# Patient Record
Sex: MALE | Race: OTHER | HISPANIC OR LATINO | URBAN - METROPOLITAN AREA
[De-identification: names, ages, dates, MRNs, and addresses within clinical notes are randomized per-mention and may not be internally consistent; named-entity substitution may affect disease eponyms.]

---

## 2017-07-12 ENCOUNTER — INPATIENT (INPATIENT)
Facility: HOSPITAL | Age: 72
LOS: 1 days | Discharge: HOME | DRG: 300 | End: 2017-07-14
Attending: INTERNAL MEDICINE | Admitting: INTERNAL MEDICINE
Payer: MEDICARE

## 2017-07-12 VITALS
TEMPERATURE: 99 F | RESPIRATION RATE: 18 BRPM | HEIGHT: 65 IN | OXYGEN SATURATION: 97 % | DIASTOLIC BLOOD PRESSURE: 76 MMHG | WEIGHT: 141.98 LBS | SYSTOLIC BLOOD PRESSURE: 129 MMHG | HEART RATE: 67 BPM

## 2017-07-12 LAB
ALBUMIN SERPL ELPH-MCNC: 3.5 G/DL — SIGNIFICANT CHANGE UP (ref 3.5–5)
ALP SERPL-CCNC: 62 U/L — SIGNIFICANT CHANGE UP (ref 40–120)
ALT FLD-CCNC: 22 U/L DA — SIGNIFICANT CHANGE UP (ref 10–60)
ANION GAP SERPL CALC-SCNC: 9 MMOL/L — SIGNIFICANT CHANGE UP (ref 5–17)
APPEARANCE UR: CLEAR — SIGNIFICANT CHANGE UP
APTT BLD: 30.8 SEC — SIGNIFICANT CHANGE UP (ref 27.5–37.4)
AST SERPL-CCNC: 21 U/L — SIGNIFICANT CHANGE UP (ref 10–40)
BILIRUB SERPL-MCNC: 0.2 MG/DL — SIGNIFICANT CHANGE UP (ref 0.2–1.2)
BILIRUB UR-MCNC: NEGATIVE — SIGNIFICANT CHANGE UP
BUN SERPL-MCNC: 69 MG/DL — HIGH (ref 7–18)
CALCIUM SERPL-MCNC: 9.1 MG/DL — SIGNIFICANT CHANGE UP (ref 8.4–10.5)
CHLORIDE SERPL-SCNC: 112 MMOL/L — HIGH (ref 96–108)
CK MB BLD-MCNC: <1.3 % — SIGNIFICANT CHANGE UP (ref 0–3.5)
CK MB CFR SERPL CALC: <1 NG/ML — SIGNIFICANT CHANGE UP (ref 0–3.6)
CK SERPL-CCNC: 75 U/L — SIGNIFICANT CHANGE UP (ref 35–232)
CO2 SERPL-SCNC: 18 MMOL/L — LOW (ref 22–31)
COLOR SPEC: YELLOW — SIGNIFICANT CHANGE UP
CREAT SERPL-MCNC: 3.18 MG/DL — HIGH (ref 0.5–1.3)
DIFF PNL FLD: ABNORMAL
GLUCOSE SERPL-MCNC: 88 MG/DL — SIGNIFICANT CHANGE UP (ref 70–99)
GLUCOSE UR QL: NEGATIVE — SIGNIFICANT CHANGE UP
HCT VFR BLD CALC: 23.8 % — LOW (ref 39–50)
HGB BLD-MCNC: 7.6 G/DL — LOW (ref 13–17)
INR BLD: 1.04 RATIO — SIGNIFICANT CHANGE UP (ref 0.88–1.16)
KETONES UR-MCNC: NEGATIVE — SIGNIFICANT CHANGE UP
LEUKOCYTE ESTERASE UR-ACNC: ABNORMAL
MCHC RBC-ENTMCNC: 30.4 PG — SIGNIFICANT CHANGE UP (ref 27–34)
MCHC RBC-ENTMCNC: 31.9 GM/DL — LOW (ref 32–36)
MCV RBC AUTO: 95.1 FL — SIGNIFICANT CHANGE UP (ref 80–100)
NITRITE UR-MCNC: NEGATIVE — SIGNIFICANT CHANGE UP
PH UR: 5 — SIGNIFICANT CHANGE UP (ref 5–8)
PLATELET # BLD AUTO: 189 K/UL — SIGNIFICANT CHANGE UP (ref 150–400)
POTASSIUM SERPL-MCNC: 5.3 MMOL/L — SIGNIFICANT CHANGE UP (ref 3.5–5.3)
POTASSIUM SERPL-SCNC: 5.3 MMOL/L — SIGNIFICANT CHANGE UP (ref 3.5–5.3)
PROT SERPL-MCNC: 9.1 G/DL — HIGH (ref 6–8.3)
PROT UR-MCNC: 30 MG/DL
PROTHROM AB SERPL-ACNC: 11.4 SEC — SIGNIFICANT CHANGE UP (ref 9.8–12.7)
RBC # BLD: 2.5 M/UL — LOW (ref 4.2–5.8)
RBC # FLD: 16.3 % — HIGH (ref 10.3–14.5)
SODIUM SERPL-SCNC: 139 MMOL/L — SIGNIFICANT CHANGE UP (ref 135–145)
SP GR SPEC: 1.01 — SIGNIFICANT CHANGE UP (ref 1.01–1.02)
TROPONIN I SERPL-MCNC: <0.015 NG/ML — SIGNIFICANT CHANGE UP (ref 0–0.04)
UROBILINOGEN FLD QL: NEGATIVE — SIGNIFICANT CHANGE UP
WBC # BLD: 11.8 K/UL — HIGH (ref 3.8–10.5)
WBC # FLD AUTO: 11.8 K/UL — HIGH (ref 3.8–10.5)

## 2017-07-12 RX ORDER — SODIUM CHLORIDE 9 MG/ML
1000 INJECTION INTRAMUSCULAR; INTRAVENOUS; SUBCUTANEOUS ONCE
Qty: 0 | Refills: 0 | Status: COMPLETED | OUTPATIENT
Start: 2017-07-12 | End: 2017-07-12

## 2017-07-12 RX ORDER — ACETYLCYSTEINE 200 MG/ML
1200 VIAL (ML) MISCELLANEOUS ONCE
Qty: 0 | Refills: 0 | Status: COMPLETED | OUTPATIENT
Start: 2017-07-12 | End: 2017-07-12

## 2017-07-12 RX ADMIN — Medication 1200 MILLIGRAM(S): at 23:07

## 2017-07-12 RX ADMIN — SODIUM CHLORIDE 333.33 MILLILITER(S): 9 INJECTION INTRAMUSCULAR; INTRAVENOUS; SUBCUTANEOUS at 23:06

## 2017-07-12 NOTE — ED PROVIDER NOTE - MEDICAL DECISION MAKING DETAILS
71 y/o M pt sent by nephrologist to ED with hematuria and abnormal sonogram result. Will check labs, check urine, EKG, consult nephrologist, and reassess.

## 2017-07-12 NOTE — ED PROVIDER NOTE - PROGRESS NOTE DETAILS
discussed risks/benefits of CTA w contrast w patient, family, radiology and nephro (Dr. Awad), will proceed w study/ premedicate +hydrate prior to/after test. Consent in chart.

## 2017-07-12 NOTE — ED PROVIDER NOTE - OBJECTIVE STATEMENT
73 y/o M pt with PMHx of Throat Cancer and no significant PSHx sent by nephrologist to ED with hematuria for several days, as well as abnormal sonogram result s/p sonogram today. As per family, pt received a sonogram today, and was told to go to the ED immediately after the results were obtained. Pt also reports intermittent dizziness described as lightheadedness. Pt denies shortness of breath, chest pain, or any other complaints. Pt also denies daily medication use. Nephrologist: Dr. Ryan Awad (phone number: [phone]). JUAN M.

## 2017-07-12 NOTE — ED PROVIDER NOTE - CARE PLAN
Principal Discharge DX:	Anemia  Secondary Diagnosis:	CRF (chronic renal failure)  Secondary Diagnosis:	AAA (abdominal aortic aneurysm) without rupture

## 2017-07-13 DIAGNOSIS — E78.5 HYPERLIPIDEMIA, UNSPECIFIED: ICD-10-CM

## 2017-07-13 DIAGNOSIS — N18.9 CHRONIC KIDNEY DISEASE, UNSPECIFIED: ICD-10-CM

## 2017-07-13 DIAGNOSIS — Z29.9 ENCOUNTER FOR PROPHYLACTIC MEASURES, UNSPECIFIED: ICD-10-CM

## 2017-07-13 DIAGNOSIS — D64.9 ANEMIA, UNSPECIFIED: ICD-10-CM

## 2017-07-13 DIAGNOSIS — I71.4 ABDOMINAL AORTIC ANEURYSM, WITHOUT RUPTURE: ICD-10-CM

## 2017-07-13 DIAGNOSIS — Z71.89 OTHER SPECIFIED COUNSELING: ICD-10-CM

## 2017-07-13 DIAGNOSIS — N18.4 CHRONIC KIDNEY DISEASE, STAGE 4 (SEVERE): ICD-10-CM

## 2017-07-13 DIAGNOSIS — C14.0 MALIGNANT NEOPLASM OF PHARYNX, UNSPECIFIED: ICD-10-CM

## 2017-07-13 DIAGNOSIS — I10 ESSENTIAL (PRIMARY) HYPERTENSION: ICD-10-CM

## 2017-07-13 LAB
FERRITIN SERPL-MCNC: 248 NG/ML — SIGNIFICANT CHANGE UP (ref 30–400)
FOLATE SERPL-MCNC: 19.3 NG/ML — SIGNIFICANT CHANGE UP (ref 4.8–24.2)
HAPTOGLOB SERPL-MCNC: 183 MG/DL — SIGNIFICANT CHANGE UP (ref 34–200)
IRON SATN MFR SERPL: 25 % — SIGNIFICANT CHANGE UP (ref 20–55)
IRON SATN MFR SERPL: 50 UG/DL — LOW (ref 65–170)
LDH SERPL L TO P-CCNC: 127 U/L — SIGNIFICANT CHANGE UP (ref 120–225)
OB PNL STL: NEGATIVE — SIGNIFICANT CHANGE UP
TIBC SERPL-MCNC: 200 UG/DL — LOW (ref 250–450)
UIBC SERPL-MCNC: 150 UG/DL — SIGNIFICANT CHANGE UP (ref 110–370)
VIT B12 SERPL-MCNC: 387 PG/ML — SIGNIFICANT CHANGE UP (ref 243–894)

## 2017-07-13 PROCEDURE — 71275 CT ANGIOGRAPHY CHEST: CPT | Mod: 26

## 2017-07-13 PROCEDURE — 93925 LOWER EXTREMITY STUDY: CPT | Mod: 26

## 2017-07-13 PROCEDURE — 99497 ADVNCD CARE PLAN 30 MIN: CPT

## 2017-07-13 PROCEDURE — 99285 EMERGENCY DEPT VISIT HI MDM: CPT | Mod: 25

## 2017-07-13 PROCEDURE — 74174 CTA ABD&PLVS W/CONTRAST: CPT | Mod: 26

## 2017-07-13 PROCEDURE — 99223 1ST HOSP IP/OBS HIGH 75: CPT

## 2017-07-13 RX ORDER — ACETYLCYSTEINE 200 MG/ML
1200 VIAL (ML) MISCELLANEOUS ONCE
Qty: 0 | Refills: 0 | Status: COMPLETED | OUTPATIENT
Start: 2017-07-13 | End: 2017-07-13

## 2017-07-13 RX ORDER — SODIUM CHLORIDE 9 MG/ML
1000 INJECTION INTRAMUSCULAR; INTRAVENOUS; SUBCUTANEOUS
Qty: 0 | Refills: 0 | Status: DISCONTINUED | OUTPATIENT
Start: 2017-07-13 | End: 2017-07-14

## 2017-07-13 RX ORDER — SODIUM CHLORIDE 9 MG/ML
1000 INJECTION INTRAMUSCULAR; INTRAVENOUS; SUBCUTANEOUS ONCE
Qty: 0 | Refills: 0 | Status: COMPLETED | OUTPATIENT
Start: 2017-07-13 | End: 2017-07-13

## 2017-07-13 RX ORDER — ERYTHROPOIETIN 10000 [IU]/ML
20000 INJECTION, SOLUTION INTRAVENOUS; SUBCUTANEOUS ONCE
Qty: 0 | Refills: 0 | Status: COMPLETED | OUTPATIENT
Start: 2017-07-13 | End: 2017-07-13

## 2017-07-13 RX ADMIN — Medication 1200 MILLIGRAM(S): at 01:38

## 2017-07-13 RX ADMIN — SODIUM CHLORIDE 60 MILLILITER(S): 9 INJECTION INTRAMUSCULAR; INTRAVENOUS; SUBCUTANEOUS at 14:22

## 2017-07-13 RX ADMIN — SODIUM CHLORIDE 250 MILLILITER(S): 9 INJECTION INTRAMUSCULAR; INTRAVENOUS; SUBCUTANEOUS at 01:38

## 2017-07-13 RX ADMIN — ERYTHROPOIETIN 20000 UNIT(S): 10000 INJECTION, SOLUTION INTRAVENOUS; SUBCUTANEOUS at 14:22

## 2017-07-13 NOTE — PROGRESS NOTE ADULT - SUBJECTIVE AND OBJECTIVE BOX
NP Note discussed with  primary attending    Patient is a 72y old  Male who presents with a chief complaint of Sent by nephrologist for finding on US Bladder as outpatient (2017 12:05)      INTERVAL HPI/OVERNIGHT EVENTS: no new complaints    MEDICATIONS  (STANDING):  sodium chloride 0.9%. 1000 milliLiter(s) (60 mL/Hr) IV Continuous <Continuous>  epoetin dex Injectable 91944 Unit(s) SubCutaneous once  __________________________________________________  REVIEW OF SYSTEMS:    CONSTITUTIONAL: No fever,   EYES: no acute visual disturbances  NECK: No pain or stiffness  RESPIRATORY: No cough; No shortness of breath  CARDIOVASCULAR: No chest pain, no palpitations  GASTROINTESTINAL: No pain. No nausea or vomiting; No diarrhea   NEUROLOGICAL: No headache or numbness, no tremors  MUSCULOSKELETAL: No joint pain, no muscle pain  GENITOURINARY: no dysuria, no frequency, no hesitancy  PSYCHIATRY: no depression , no anxiety  ALL OTHER  ROS negative        Vital Signs Last 24 Hrs  T(C): 36.6 (2017 13:48), Max: 37.1 (2017 18:36)  T(F): 97.8 (2017 13:48), Max: 98.7 (2017 18:36)  HR: 70 (2017 13:48) (65 - 74)  BP: 149/66 (2017 13:48) (122/72 - 149/66)  BP(mean): --  RR: 16 (2017 13:48) (16 - 18)  SpO2: 100% (2017 13:48) (97% - 100%)    ________________________________________________  PHYSICAL EXAM:  GENERAL: NAD  HEENT:Normocephalic;  conjunctivae and sclerae clear; moist mucous membranes;   NECK : supple  CHEST/LUNG: Clear to auscuitation bilaterally with good air entry   HEART: S1 S2  regular; no murmurs, gallops or rubs  ABDOMEN: Soft, Nontender, Nondistended; Bowel sounds present  EXTREMITIES: no cyanosis; no edema; no calf tenderness  SKIN: warm and dry; no rash  NERVOUS SYSTEM:  Awake and alert; Oriented  to place, person and time ; no new deficits    _________________________________________________  LABS:                        7.6    11.8  )-----------( 189      ( 2017 22:49 )             23.8     07-12    139  |  112<H>  |  69<H>  ----------------------------<  88  5.3   |  18<L>  |  3.18<H>    Ca    9.1      2017 22:49    TPro  9.1<H>  /  Alb  3.5  /  TBili  0.2  /  DBili  x   /  AST  21  /  ALT  22  /  AlkPhos  62      PT/INR - ( 2017 22:49 )   PT: 11.4 sec;   INR: 1.04 ratio         PTT - ( 2017 22:49 )  PTT:30.8 sec  Urinalysis Basic - ( 2017 22:57 )    Color: Yellow / Appearance: Clear / S.010 / pH: x  Gluc: x / Ketone: Negative  / Bili: Negative / Urobili: Negative   Blood: x / Protein: 30 mg/dL / Nitrite: Negative   Leuk Esterase: Trace / RBC: >50 /HPF / WBC 3-5 /HPF   Sq Epi: x / Non Sq Epi: Few / Bacteria: Trace /HPF      CAPILLARY BLOOD GLUCOSE            RADIOLOGY & ADDITIONAL TESTS:    Imaging Personally Reviewed:  YES    Consultant(s) Notes Reviewed:   YES    Care Discussed with Consultants : jojo Awad    Plan of care was discussed with patient and /or primary care giver; all questions and concerns were addressed and care was aligned with patient's wishes.

## 2017-07-13 NOTE — H&P ADULT - PROBLEM SELECTOR PLAN 2
Patient found to be anemic  -patient has no prior labs for comparison  -will check anemia workup for iron deficiency, hemolytic  -Spoke with Dr. Awad, likely anemia of CKD, will give 1 dose of 20,000 Procrit and monitor response

## 2017-07-13 NOTE — H&P ADULT - ASSESSMENT
72M from home PMH of Throat CA (resolved, in remission for last 10 years), HTN (off meds), HLD (off meds) and CKD presents to hospital after being sent by nephrologist for AAA. Seen by vascular, no need for urgent intervention. But found to be anemic as well

## 2017-07-13 NOTE — H&P ADULT - NSHPPHYSICALEXAM_GEN_ALL_CORE
Vital Signs Last 24 Hrs  T(C): 36.3 (13 Jul 2017 05:14), Max: 37.1 (12 Jul 2017 18:36)  T(F): 97.3 (13 Jul 2017 05:14), Max: 98.7 (12 Jul 2017 18:36)  HR: 68 (13 Jul 2017 05:14) (65 - 74)  BP: 129/61 (13 Jul 2017 05:14) (122/72 - 135/60)  BP(mean): --  RR: 16 (13 Jul 2017 05:14) (16 - 18)  SpO2: 100% (13 Jul 2017 05:14) (97% - 100%)    GENERAL: NAD, well-groomed, well-developed  HEAD:  Atraumatic, Normocephalic  EYES: EOMI, PERRLA, conjunctiva and sclera clear  ENMT: No tonsillar erythema, exudates, or enlargement; Moist mucous membranes, Good dentition, No lesions  NECK: Supple, No JVD, Normal thyroid  NERVOUS SYSTEM:  Alert & Oriented X3  CHEST/LUNG: Clear to auscultation bilaterally; No rales, rhonchi, wheezing, or rubs  HEART: Regular rate and rhythm; No murmurs, rubs, or gallops  ABDOMEN: Soft, Nontender, large but nondistended; Bowel sounds present  EXTREMITIES:  2+ Peripheral Pulses, No clubbing, cyanosis, or edema  LYMPH: No lymphadenopathy noted  SKIN: No rashes or lesions

## 2017-07-13 NOTE — H&P ADULT - PROBLEM SELECTOR PLAN 6
Not on any medications at home and BP has been normal here in the hospital  -please monitor BP and may consider starting meds if necessary

## 2017-07-13 NOTE — H&P ADULT - PROBLEM SELECTOR PLAN 1
Patient has 7.1 cm AAA  -seen by vascular surgeon Dr. Laura  -large but asymptomatic  -will need Outpatient repair  -Will DC patient with follow up with Dr. Laura  -Check LE Arterial US r/o ADILIA

## 2017-07-13 NOTE — H&P ADULT - NSHPLABSRESULTS_GEN_ALL_CORE
CT negative for aortic dissection, has AAA infrarenal of 7.1 cm  Patient is anemic to 7.6, no known baseline  Lytes wnl  UA shows hematuria

## 2017-07-13 NOTE — CONSULT NOTE ADULT - SUBJECTIVE AND OBJECTIVE BOX
CAlled by ER re pt w AAA s/p CTA  Pt sent in to ER w recent hematuria/anemia and abnorm outpt abd US.  Got CTA in ER: AAA    Pt denies CP/Back pain/Abd pain/intermitt non-spec ch LE pain.  Eats ok, walks ok  Denies heart probs'  Appy  ?meds  ex smoker  FH: ? AAA    AA, NAD, MICHELE's  No JVD/icterus  ABd: Palp enlarged NT Aorta  Ext's: warm/norm color, no edema  Palp rad, fem, pop (+3), R DP/PT, L PT    Cr 3.18  Hbg 7.6    CTA reviewed: Large 7cm AAA, no rupture/leak/    A/P:   AAA- large/asymp.  Will need elective repair  Renal insuff (gotr contrast last night)- R hand dom.  No punctures in L UE for poss future AVF.  Renal fu  CArdio eval  Screen direct realtives for aneurysms. CAlled by ER re pt w AAA s/p CTA  Pt sent in to ER w recent hematuria/anemia and abnorm outpt abd US.  Got CTA in ER: AAA    Pt denies CP/Back pain/Abd pain/intermitt non-spec ch LE pain.  Eats ok, walks ok  Denies heart probs'  Appy  ?meds  ex smoker  FH: ? AAA    AA, NAD, MICHELE's  No JVD/icterus  ABd: Palp enlarged NT Aorta  Ext's: warm/norm color, no edema  Palp rad, fem, pop (+3), R DP/PT, L PT    Cr 3.18  Hbg 7.6    CTA reviewed: Large 7cm AAA, no rupture/leak/    A/P:   AAA- large/asymp.  Will need elective repair  Renal insuff (gotr contrast last night)- R hand dom.  No punctures in L UE for poss future AVF.  Renal fu  CArdio eval  Screen direct realtives for aneurysms.  Check LE art US r/oPAA's

## 2017-07-13 NOTE — H&P ADULT - PROBLEM SELECTOR PLAN 3
CKD stage 4  -may be close to HD as per nephrologist  -To be seen by Dr. Awad in house  -PT got CT scan with IV contrast to evaluate AAA, got pre and post treatment, monitor Cr in AM

## 2017-07-13 NOTE — GOALS OF CARE CONVERSATION - PERSONAL ADVANCE DIRECTIVE - CONVERSATION DETAILS
patient appoints his daughter Eleonora To as  [phone]  he lives with wife Karma Roy  would like full code but no long term intubation

## 2017-07-13 NOTE — PROGRESS NOTE ADULT - PROBLEM SELECTOR PLAN 2
no baseline  procrit dose as per nephrology   f/u anemia workup for iron deficiency, hemolytic  f/u nephrology Dr. Awad

## 2017-07-13 NOTE — CONSULT NOTE ADULT - PROBLEM SELECTOR RECOMMENDATION 9
Aetiology - hypertensive nephrosclerosis. with microscopic hematuria. Renal arteries not involved by aneurysm. microscopic hematuria. send serologies and labs results will be followed as outpatient

## 2017-07-13 NOTE — PROGRESS NOTE ADULT - PROBLEM SELECTOR PLAN 8
patient appoints his daughter Eleonora To as  [phone]  lives with wife Karma Roy  would like full code   no long term intubation  Goals of care and HCP conversation with patient and family at the bedside more than 30 min

## 2017-07-13 NOTE — H&P ADULT - NSHPREVIEWOFSYSTEMS_GEN_ALL_CORE
REVIEW OF SYSTEMS:  CONSTITUTIONAL: No fever, weight loss, or fatigue  EYES: No eye pain, visual disturbances, or discharge  ENMT:  No difficulty hearing, tinnitus, vertigo; No sinus or throat pain  NECK: No pain or stiffness  BREASTS: No pain, masses, or nipple discharge  RESPIRATORY: No cough, wheezing, chills or hemoptysis; No shortness of breath  CARDIOVASCULAR: No chest pain, palpitations, dizziness, or leg swelling  GASTROINTESTINAL: No abdominal or epigastric pain. No nausea, vomiting, or hematemesis; No diarrhea or constipation. No melena or hematochezia.  GENITOURINARY: No dysuria, frequency, hematuria, or incontinence  NEUROLOGICAL: No headaches, memory loss, loss of strength, numbness, or tremors  SKIN: No itching, burning, rashes, or lesions   LYMPH NODES: No enlarged glands  ENDOCRINE: No heat or cold intolerance; No hair loss  MUSCULOSKELETAL: No joint pain or swelling; No muscle, back, or extremity pain  PSYCHIATRIC: No depression, anxiety, mood swings, or difficulty sleeping  HEME/LYMPH: No easy bruising, or bleeding gums  ALLERY AND IMMUNOLOGIC: No hives or eczema

## 2017-07-13 NOTE — H&P ADULT - HISTORY OF PRESENT ILLNESS
72M from home PMH of Throat CA (resolved, in remission for last 10 years), HTN (off meds), HLD (off meds) and CKD presents to hospital after being sent by nephrologist. Patient sees Dr. Awad as outpatient and it was noted that patient had hematuria and an US renal was done. Incidental finding on the exam was an abdominal aortic aneurysm so patient was sent to hospital for emergent evaluation. As per patient, he is still making urine and when he drinks a lot of water, he will urine accordingly but nephrologist told him that he is close to being on dialysis.

## 2017-07-13 NOTE — CONSULT NOTE ADULT - SUBJECTIVE AND OBJECTIVE BOX
Patient is a 72y Male sent to ED from nephrology clinic. Was first seen at our office yesterday for evaluation of CKD and hematuria. A routine renal sonogram showed aortic aneurysm with ? 40% diameter with thrombosis. Asked to come to ED for emergent evaluation to rule-out  aortic dissection. D/W with ED last night and patient given pre-contrast exposure prophylaxis and CTA done. Has a infra-renal aortic aneurysm with no evidence of dissection. Bilateral renal arteries patent. Seen by vascular and no urgent need for repair. Noted to be anemic, s/p epogen. FE studies  pending.    PAST MEDICAL & SURGICAL HISTORY:  HLD (hyperlipidemia)  HTN (hypertension)  CKD (chronic kidney disease)  Throat cancer  No significant past surgical history    No Known Allergies    Home Medications Reviewed  Hospital Medications:   MEDICATIONS  (STANDING):  sodium chloride 0.9%. 1000 milliLiter(s) (60 mL/Hr) IV Continuous <Continuous>    SOCIAL HISTORY:  Denies ETOh,Smoking,   FAMILY HISTORY:  No pertinent family history in first degree relatives    REVIEW OF SYSTEMS:  CONSTITUTIONAL: No weakness, fevers or chills  EYES/ENT: No visual changes;  No vertigo or throat pain   NECK: No pain or stiffness  RESPIRATORY: No cough, wheezing, hemoptysis; No shortness of breath  CARDIOVASCULAR: No chest pain or palpitations.  GASTROINTESTINAL: No abdominal or epigastric pain. No nausea, vomiting, or hematemesis; No diarrhea or constipation. No melena or hematochezia.  GENITOURINARY: No dysuria, frequency, foamy urine, urinary urgency, incontinence or hematuria  NEUROLOGICAL: No numbness or weakness  SKIN: No itching, burning, rashes, or lesions   VASCULAR: No bilateral lower extremity edema.   All other review of systems is negative unless indicated above.    VITALS:  T(F): 97.8 (17 @ 13:48), Max: 98.7 (17 @ 18:36)  HR: 70 (17 @ 13:48)  BP: 149/66 (17 @ 13:48)  RR: 16 (17 @ 13:48)  SpO2: 100% (17 13:48)  Wt(kg): --    Height (cm): 165.1 ( @ 18:36)  Weight (kg): 64.4 ( 18:36)  BMI (kg/m2): 23.6 ( @ 18:36)  BSA (m2): 1.71 ( @ 18:36)  PHYSICAL EXAM:  Constitutional: NAD  HEENT: anicteric sclera, oropharynx clear, MMM  Neck: No JVD  Respiratory: CTAB, no wheezes, rales or rhonchi  Cardiovascular: S1, S2, RRR  Gastrointestinal: BS+, soft, NT/ND  Extremities: No cyanosis or clubbing. No peripheral edema  Neurological: A/O x 3, no focal deficits  Psychiatric: Normal mood, normal affect  : No CVA tenderness. No couch.   Skin: No rashes  Vascular Access:    LABS:      139  |  112<H>  |  69<H>  ----------------------------<  88  5.3   |  18<L>  |  3.18<H>    Ca    9.1      2017 22:49    TPro  9.1<H>  /  Alb  3.5  /  TBili  0.2  /  DBili      /  AST  21  /  ALT  22  /  AlkPhos  62      Creatinine Trend: 3.18 <--                        7.6    11.8  )-----------( 189      ( 2017 22:49 )             23.8     Urine Studies:  Urinalysis Basic - ( 2017 22:57 )    Color: Yellow / Appearance: Clear / S.010 / pH:   Gluc:  / Ketone: Negative  / Bili: Negative / Urobili: Negative   Blood:  / Protein: 30 mg/dL / Nitrite: Negative   Leuk Esterase: Trace / RBC: >50 /HPF / WBC 3-5 /HPF   Sq Epi:  / Non Sq Epi: Few / Bacteria: Trace /HPF        RADIOLOGY & ADDITIONAL STUDIES:      < from: CT Angio Abdomen and Pelvis w/ IV Cont (17 @ 01:31) >    Vascular:   No intimal flap is seen to suggest aortic dissection. Evaluation of the   aortic root is limited due tocardiac pulsation. Diffuse moderate   vascular disease of the aorta and its branches. Ascending aorta, arch,   descending thoracic aorta is normal caliber with diffuse luminal post   carotid plaque.    There is a fusiform infrarenal abdominal aortic aneurysm measuring up to   7.1 cm maximally. No CT findings of impending rupture are seen.    The celiac axis and SMA are patent. ANGELA is patent. Bilateral renal   arteries are patent. The bilateral iliac arteries are normal caliber.   Bilateral commonfemoral artery measuring up to 1.1 cm with no   significant atherosclerotic disease.      < from: CT Angio Abdomen and Pelvis w/ IV Cont (17 @ 01:31) >  Kidneys: No hydronephrosis or solid mass. Mildly irregular enhancement of   the interpolar region the left kidney is likely secondary to contrast   bolus timing, although it would be difficult to entirely exclude the   possibility of a renal mass. Mild bilateral perinephric stranding is   nonspecific.

## 2017-07-14 VITALS
DIASTOLIC BLOOD PRESSURE: 62 MMHG | TEMPERATURE: 98 F | OXYGEN SATURATION: 100 % | HEART RATE: 72 BPM | RESPIRATION RATE: 18 BRPM | SYSTOLIC BLOOD PRESSURE: 155 MMHG

## 2017-07-14 LAB
24R-OH-CALCIDIOL SERPL-MCNC: 37.7 NG/ML — SIGNIFICANT CHANGE UP (ref 30–100)
ANION GAP SERPL CALC-SCNC: 12 MMOL/L — SIGNIFICANT CHANGE UP (ref 5–17)
AUTO DIFF PNL BLD: NEGATIVE — SIGNIFICANT CHANGE UP
BUN SERPL-MCNC: 57 MG/DL — HIGH (ref 7–18)
C-ANCA SER-ACNC: NEGATIVE — SIGNIFICANT CHANGE UP
CALCIUM SERPL-MCNC: 8.5 MG/DL — SIGNIFICANT CHANGE UP (ref 8.4–10.5)
CALCIUM SERPL-MCNC: 9.1 MG/DL — SIGNIFICANT CHANGE UP (ref 8.4–10.5)
CHLORIDE SERPL-SCNC: 116 MMOL/L — HIGH (ref 96–108)
CHOLEST SERPL-MCNC: 174 MG/DL — SIGNIFICANT CHANGE UP (ref 10–199)
CO2 SERPL-SCNC: 16 MMOL/L — LOW (ref 22–31)
CREAT SERPL-MCNC: 2.71 MG/DL — HIGH (ref 0.5–1.3)
CREATININE, URINE RESULT: 30 MG/DL — SIGNIFICANT CHANGE UP
CULTURE RESULTS: NO GROWTH — SIGNIFICANT CHANGE UP
GLUCOSE SERPL-MCNC: 81 MG/DL — SIGNIFICANT CHANGE UP (ref 70–99)
HBA1C BLD-MCNC: 5.7 % — HIGH (ref 4–5.6)
HBV CORE AB SER-ACNC: SIGNIFICANT CHANGE UP
HBV SURFACE AB SER-ACNC: SIGNIFICANT CHANGE UP
HBV SURFACE AG SER-ACNC: SIGNIFICANT CHANGE UP
HCT VFR BLD CALC: 21.9 % — LOW (ref 39–50)
HCV AB S/CO SERPL IA: 0.22 S/CO — SIGNIFICANT CHANGE UP
HCV AB SERPL-IMP: SIGNIFICANT CHANGE UP
HDLC SERPL-MCNC: 48 MG/DL — SIGNIFICANT CHANGE UP (ref 40–125)
HGB BLD-MCNC: 7.2 G/DL — LOW (ref 13–17)
LIPID PNL WITH DIRECT LDL SERPL: 108 MG/DL — SIGNIFICANT CHANGE UP
MAGNESIUM SERPL-MCNC: 1.6 MG/DL — SIGNIFICANT CHANGE UP (ref 1.6–2.6)
MCHC RBC-ENTMCNC: 30.2 PG — SIGNIFICANT CHANGE UP (ref 27–34)
MCHC RBC-ENTMCNC: 33 GM/DL — SIGNIFICANT CHANGE UP (ref 32–36)
MCV RBC AUTO: 91.5 FL — SIGNIFICANT CHANGE UP (ref 80–100)
P-ANCA SER-ACNC: NEGATIVE — SIGNIFICANT CHANGE UP
PHOSPHATE SERPL-MCNC: 3.5 MG/DL — SIGNIFICANT CHANGE UP (ref 2.5–4.5)
PLATELET # BLD AUTO: 156 K/UL — SIGNIFICANT CHANGE UP (ref 150–400)
POTASSIUM SERPL-MCNC: 4.8 MMOL/L — SIGNIFICANT CHANGE UP (ref 3.5–5.3)
POTASSIUM SERPL-SCNC: 4.8 MMOL/L — SIGNIFICANT CHANGE UP (ref 3.5–5.3)
PROT ?TM UR-MCNC: 43 MG/DL — HIGH (ref 0–12)
PROT SERPL-MCNC: 7.7 G/DL — SIGNIFICANT CHANGE UP (ref 6–8.3)
PROT SERPL-MCNC: 7.7 G/DL — SIGNIFICANT CHANGE UP (ref 6–8.3)
PTH-INTACT FLD-MCNC: 157 PG/ML — HIGH (ref 15–65)
RBC # BLD: 2.39 M/UL — LOW (ref 4.2–5.8)
RBC # FLD: 16.6 % — HIGH (ref 10.3–14.5)
SODIUM SERPL-SCNC: 144 MMOL/L — SIGNIFICANT CHANGE UP (ref 135–145)
SPECIMEN SOURCE: SIGNIFICANT CHANGE UP
T3FREE SERPL-MCNC: 2.1 PG/ML — SIGNIFICANT CHANGE UP (ref 1.8–4.6)
T4 FREE SERPL-MCNC: 0.9 NG/DL — SIGNIFICANT CHANGE UP (ref 0.9–1.8)
TOTAL CHOLESTEROL/HDL RATIO MEASUREMENT: 3.6 RATIO — SIGNIFICANT CHANGE UP (ref 3.4–9.6)
TRANSFERRIN SERPL-MCNC: 177 MG/DL — LOW (ref 200–360)
TRIGL SERPL-MCNC: 89 MG/DL — SIGNIFICANT CHANGE UP (ref 10–149)
TSH SERPL-MCNC: 34 UU/ML — HIGH (ref 0.34–4.82)
VIT D25+D1,25 OH+D1,25 PNL SERPL-MCNC: 35.8 PG/ML — SIGNIFICANT CHANGE UP (ref 19.9–79.3)
WBC # BLD: 7.5 K/UL — SIGNIFICANT CHANGE UP (ref 3.8–10.5)
WBC # FLD AUTO: 7.5 K/UL — SIGNIFICANT CHANGE UP (ref 3.8–10.5)

## 2017-07-14 PROCEDURE — 84484 ASSAY OF TROPONIN QUANT: CPT

## 2017-07-14 PROCEDURE — 87340 HEPATITIS B SURFACE AG IA: CPT

## 2017-07-14 PROCEDURE — 86334 IMMUNOFIX E-PHORESIS SERUM: CPT

## 2017-07-14 PROCEDURE — 85610 PROTHROMBIN TIME: CPT

## 2017-07-14 PROCEDURE — 84165 PROTEIN E-PHORESIS SERUM: CPT

## 2017-07-14 PROCEDURE — 83036 HEMOGLOBIN GLYCOSYLATED A1C: CPT

## 2017-07-14 PROCEDURE — 99285 EMERGENCY DEPT VISIT HI MDM: CPT | Mod: 25

## 2017-07-14 PROCEDURE — 83550 IRON BINDING TEST: CPT

## 2017-07-14 PROCEDURE — 82607 VITAMIN B-12: CPT

## 2017-07-14 PROCEDURE — 93005 ELECTROCARDIOGRAM TRACING: CPT

## 2017-07-14 PROCEDURE — 86036 ANCA SCREEN EACH ANTIBODY: CPT

## 2017-07-14 PROCEDURE — 86706 HEP B SURFACE ANTIBODY: CPT

## 2017-07-14 PROCEDURE — 82728 ASSAY OF FERRITIN: CPT

## 2017-07-14 PROCEDURE — 85730 THROMBOPLASTIN TIME PARTIAL: CPT

## 2017-07-14 PROCEDURE — 84439 ASSAY OF FREE THYROXINE: CPT

## 2017-07-14 PROCEDURE — 36415 COLL VENOUS BLD VENIPUNCTURE: CPT

## 2017-07-14 PROCEDURE — 80061 LIPID PANEL: CPT

## 2017-07-14 PROCEDURE — 80053 COMPREHEN METABOLIC PANEL: CPT

## 2017-07-14 PROCEDURE — 84166 PROTEIN E-PHORESIS/URINE/CSF: CPT

## 2017-07-14 PROCEDURE — 84481 FREE ASSAY (FT-3): CPT

## 2017-07-14 PROCEDURE — 74174 CTA ABD&PLVS W/CONTRAST: CPT

## 2017-07-14 PROCEDURE — 84443 ASSAY THYROID STIM HORMONE: CPT

## 2017-07-14 PROCEDURE — 83970 ASSAY OF PARATHORMONE: CPT

## 2017-07-14 PROCEDURE — 86803 HEPATITIS C AB TEST: CPT

## 2017-07-14 PROCEDURE — 82272 OCCULT BLD FECES 1-3 TESTS: CPT

## 2017-07-14 PROCEDURE — 82550 ASSAY OF CK (CPK): CPT

## 2017-07-14 PROCEDURE — 83010 ASSAY OF HAPTOGLOBIN QUANT: CPT

## 2017-07-14 PROCEDURE — 82553 CREATINE MB FRACTION: CPT

## 2017-07-14 PROCEDURE — 84155 ASSAY OF PROTEIN SERUM: CPT

## 2017-07-14 PROCEDURE — 86704 HEP B CORE ANTIBODY TOTAL: CPT

## 2017-07-14 PROCEDURE — 82306 VITAMIN D 25 HYDROXY: CPT

## 2017-07-14 PROCEDURE — 83615 LACTATE (LD) (LDH) ENZYME: CPT

## 2017-07-14 PROCEDURE — 87086 URINE CULTURE/COLONY COUNT: CPT

## 2017-07-14 PROCEDURE — 86038 ANTINUCLEAR ANTIBODIES: CPT

## 2017-07-14 PROCEDURE — 82652 VIT D 1 25-DIHYDROXY: CPT

## 2017-07-14 PROCEDURE — 83735 ASSAY OF MAGNESIUM: CPT

## 2017-07-14 PROCEDURE — 93925 LOWER EXTREMITY STUDY: CPT

## 2017-07-14 PROCEDURE — 85027 COMPLETE CBC AUTOMATED: CPT

## 2017-07-14 PROCEDURE — 84100 ASSAY OF PHOSPHORUS: CPT

## 2017-07-14 PROCEDURE — 84466 ASSAY OF TRANSFERRIN: CPT

## 2017-07-14 PROCEDURE — 81001 URINALYSIS AUTO W/SCOPE: CPT

## 2017-07-14 PROCEDURE — 71275 CT ANGIOGRAPHY CHEST: CPT

## 2017-07-14 PROCEDURE — 82310 ASSAY OF CALCIUM: CPT

## 2017-07-14 PROCEDURE — 82746 ASSAY OF FOLIC ACID SERUM: CPT

## 2017-07-14 PROCEDURE — 80048 BASIC METABOLIC PNL TOTAL CA: CPT

## 2017-07-14 NOTE — DISCHARGE NOTE ADULT - PLAN OF CARE
Patient will remain asymptomatic Please follow-up with vascular as outpatient for AAA management  Renal diet  Activity as tolerated follow-up with PCP Follow - up with MD Awad as outpatient for noé Follow up with MD Awad for pending serology review, and further management Please follow-up with vascular ([place]) as outpatient for AAA management  Renal diet  Activity as tolerated

## 2017-07-14 NOTE — PROGRESS NOTE ADULT - PROBLEM SELECTOR PLAN 6
IMPROVE SCORE 0. No need for DVT prophylaxis at this time. IMPROVE SCORE 1. No need for DVT prophylaxis at this time.

## 2017-07-14 NOTE — DISCHARGE NOTE ADULT - CARE PROVIDER_API CALL
Ryan Awad), Internal Medicine; Nephrology  [address] [place] [address]  Phone: [phone]  Fax: [phone]    Ronnie Laura), Vascular Surgery  [address] [place] [id number]  Phone: [phone]  Fax: [phone]

## 2017-07-14 NOTE — PROGRESS NOTE ADULT - PROBLEM SELECTOR PLAN 5
History History - patient not on home medication  -lipid panel WNL  -follow-up as outpatient with PCP

## 2017-07-14 NOTE — PROGRESS NOTE ADULT - SUBJECTIVE AND OBJECTIVE BOX
Patient is a 72y Male with CKD IV.    No Known Allergies    Hospital Medications:   MEDICATIONS  (STANDING):  sodium chloride 0.9%. 1000 milliLiter(s) (60 mL/Hr) IV Continuous <Continuous>        VITALS:  T(F): 97.7 (17 @ 05:01), Max: 97.8 (17 @ 13:48)  HR: 74 (17 @ 05:01)  BP: 111/45 (17 @ 05:01)  RR: 16 (17 @ 05:01)  SpO2: 99% (17 @ 05:01)  Wt(kg): --      PHYSICAL EXAM:  Constitutional: NAD  HEENT: anicteric sclera, oropharynx clear, MMM  Neck: No JVD  Respiratory: CTAB, no wheezes, rales or rhonchi  Cardiovascular: S1, S2, RRR  Gastrointestinal: BS+, soft, NT/ND  Extremities: No cyanosis or clubbing. No peripheral edema  Neurological: A/O x 3, no focal deficits  Psychiatric: Normal mood, normal affect    LABS:      144  |  116<H>  |  57<H>  ----------------------------<  81  4.8   |  16<L>  |  2.71<H>    Ca    8.5      2017 07:27  Phos  3.5     07-  Mg     1.6         TPro  9.1<H>  /  Alb  3.5  /  TBili  0.2  /  DBili      /  AST  21  /  ALT  22  /  AlkPhos  62  07-12    Creatinine Trend: 2.71 <--, 3.18 <--                        7.2    7.5   )-----------( 156      ( 2017 07:27 )             21.9     Urine Studies:  Urinalysis Basic - ( 2017 22:57 )    Color: Yellow / Appearance: Clear / S.010 / pH:   Gluc:  / Ketone: Negative  / Bili: Negative / Urobili: Negative   Blood:  / Protein: 30 mg/dL / Nitrite: Negative   Leuk Esterase: Trace / RBC: >50 /HPF / WBC 3-5 /HPF   Sq Epi:  / Non Sq Epi: Few / Bacteria: Trace /HPF        RADIOLOGY & ADDITIONAL STUDIES:

## 2017-07-14 NOTE — PROGRESS NOTE ADULT - PROBLEM SELECTOR PLAN 1
continue to monitor patient for symptoms while in hospital  follow with vascular as outpatient.
large infrarenal 7.1 cm AAA - patient asymptomatic  vascular surgery Dr. Laura consulted - recommends elective sx  f/u LE Arterial US r/o ADILIA
renal function stable. hepatitis panel neg. other serologies pending will be reviewed as outpatient. No objection to D/C today

## 2017-07-14 NOTE — PROGRESS NOTE ADULT - SUBJECTIVE AND OBJECTIVE BOX
Patient is a 72y old  Male who presents with a chief complaint of Sent by nephrologist for finding on US Bladder as outpatient (2017 12:05). PMH significant for     INTERVAL HPI/OVERNIGHT EVENTS: no acute events overnight.    I&O's Summary    Vital Signs Last 24 Hrs  T(C): 36.4 (2017 14:04), Max: 36.5 (2017 05:01)  T(F): 97.6 (2017 14:04), Max: 97.7 (2017 05:01)  HR: 72 (2017 14:04) (69 - 74)  BP: 155/62 (2017 14:04) (111/45 - 155/62)  BP(mean): 85 (2017 14:04) (85 - 85)  RR: 18 (2017 14:04) (16 - 18)  SpO2: 100% (2017 14:04) (94% - 100%)  PAST MEDICAL & SURGICAL HISTORY:  HLD (hyperlipidemia)  HTN (hypertension)  CKD (chronic kidney disease)  Throat cancer  No significant past surgical history      LABS:                        7.2    7.5   )-----------( 156      ( 2017 07:27 )             21.9     07-14    144  |  116<H>  |  57<H>  ----------------------------<  81  4.8   |  16<L>  |  2.71<H>    Ca    8.5      2017 07:27  Phos  3.5     07-14  Mg     1.6     07-14    TPro  9.1<H>  /  Alb  3.5  /  TBili  0.2  /  DBili  x   /  AST  21  /  ALT  22  /  AlkPhos  62  07-12    PT/INR - ( 2017 22:49 )   PT: 11.4 sec;   INR: 1.04 ratio         PTT - ( 2017 22:49 )  PTT:30.8 sec  CAPILLARY BLOOD GLUCOSE      Urinalysis Basic - ( 2017 22:57 )    Color: Yellow / Appearance: Clear / S.010 / pH: x  Gluc: x / Ketone: Negative  / Bili: Negative / Urobili: Negative   Blood: x / Protein: 30 mg/dL / Nitrite: Negative   Leuk Esterase: Trace / RBC: >50 /HPF / WBC 3-5 /HPF   Sq Epi: x / Non Sq Epi: Few / Bacteria: Trace /HPF        MEDICATIONS  (STANDING):  sodium chloride 0.9%. 1000 milliLiter(s) (60 mL/Hr) IV Continuous <Continuous>    MEDICATIONS  (PRN):      REVIEW OF SYSTEMS:  CONSTITUTIONAL: No fever, weight loss, or fatigue  EYES: No eye pain, visual disturbances, or discharge  ENMT:  No difficulty hearing, tinnitus, vertigo; No sinus or throat pain  NECK: No pain or stiffness  RESPIRATORY: No cough, wheezing, chills or hemoptysis; No shortness of breath  CARDIOVASCULAR: No chest pain, palpitations, dizziness, or leg swelling  GASTROINTESTINAL: No abdominal or epigastric pain. No nausea, vomiting, or hematemesis; No diarrhea or constipation. No melena or hematochezia.  GENITOURINARY: No dysuria, frequency, hematuria, or incontinence  NEUROLOGICAL: No headaches, memory loss, loss of strength, numbness, or tremors  SKIN: No itching, burning, rashes, or lesions   LYMPH NODES: No enlarged glands  ENDOCRINE: No heat or cold intolerance; No hair loss  MUSCULOSKELETAL: No joint pain or swelling; No muscle, back, or extremity pain  PSYCHIATRIC: No depression, anxiety, mood swings, or difficulty sleeping  HEME/LYMPH: No easy bruising, or bleeding gums  ALLERGY AND IMMUNOLOGIC: No hives or eczema    RADIOLOGY & ADDITIONAL TESTS:  < from: CT Angio Abdomen and Pelvis w/ IV Cont (17 @ 01:31) >  There is a fusiform infrarenal abdominal aortic aneurysm measuring up to   7.1 cm maximally. No CT findings of impending rupture are seen.  The celiac axis and SMA are patent. ANGELA is patent. Bilateral renal   arteries are patent. The bilateral iliac arteries are normal caliber.   Bilateral commonfemoral artery measuring up to 1.1 cm with no   significant atherosclerotic disease.  Thorax:  Airways: Tracheobronchial tree is patent.  Lungs: No pneumonia, pulmonary edema, or dominant masses. Moderate   predominantly paraseptal emphysema.  Biliary: No dilatation. Cholelithiasis.  Kidneys: No hydronephrosis or solid mass. Mildly irregular enhancement of   the interpolar region the left kidney is likely secondary to contrast   bolus timing, although it would be difficult to entirely exclude the   possibility of a renal mass. Mild bilateral perinephric stranding is   nonspecific.    IMPRESSION:  Negative for aortic dissection.  Large infrarenal AAA measuring 7.1 cm. No CT findings of rupture.      < from: US Duplex Arterial Lower Ext Compl, Bilateral (17 @ 13:19) >  Interpretation:   Right leg: There is ectasia of the right popliteal artery to a maximum   measured diameter of 8.3 mm. Velocity is symmetrical with the left side.  Left leg: There is ectasia of the left popliteal artery to a maximum   measured diameter of 7.8 mm. Velocity is symmetrical with the right side.  IMPRESSION:  Prominent popliteal arteries which do not meet criteria for   aneurysmal dilatation.        Imaging Personally Reviewed:  [x] YES  [ ] NO    Consultant(s) Notes Reviewed:  [x] YES  [ ] NO    PHYSICAL EXAM:  GENERAL: NAD, well-groomed, well-developed male lying in bed  HEAD:  Atraumatic, Normocephalic  EYES: EOMI, PERRLA, conjunctiva and sclera clear  ENMT: No tonsillar erythema, exudates, or enlargement; Moist mucous membranes, Good dentition, No lesions  NECK: Supple, No JVD noted  NERVOUS SYSTEM:  Alert & Oriented X3, Good concentration; Motor Strength 5/5 B/L upper and lower extremities; DTRs 2+ intact and symmetric  CHEST/LUNG: Clear to percussion bilaterally; No rales, rhonchi, wheezing, or rubs  HEART: Regular rate and rhythm; No murmurs, rubs, or gallops  ABDOMEN: Soft, Nontender, Nondistended; Bowel sounds present  EXTREMITIES:  2+ Peripheral Pulses, No clubbing, cyanosis, or edema  LYMPH: No lymphadenopathy noted  SKIN: No rashes or lesions    Care Collaborated Discussed with Consultants/Other Providers [x] YES  [ ] NO Patient is a 72y old  Male who presents with a chief complaint of Sent by nephrologist for finding on US Bladder as outpatient (2017 12:05). PMH significant for HTN, HLD, CKD stage 4, and throat cancer 10yrs ago.      INTERVAL HPI/OVERNIGHT EVENTS: no acute events overnight.    I&O's Summary    Vital Signs Last 24 Hrs  T(C): 36.4 (2017 14:04), Max: 36.5 (2017 05:01)  T(F): 97.6 (2017 14:04), Max: 97.7 (2017 05:01)  HR: 72 (2017 14:04) (69 - 74)  BP: 155/62 (2017 14:04) (111/45 - 155/62)  BP(mean): 85 (2017 14:04) (85 - 85)  RR: 18 (2017 14:04) (16 - 18)  SpO2: 100% (2017 14:04) (94% - 100%)  PAST MEDICAL & SURGICAL HISTORY:  HLD (hyperlipidemia)  HTN (hypertension)  CKD (chronic kidney disease)  Throat cancer  No significant past surgical history      LABS:                        7.2    7.5   )-----------( 156      ( 2017 07:27 )             21.9     07-14    144  |  116<H>  |  57<H>  ----------------------------<  81  4.8   |  16<L>  |  2.71<H>    Ca    8.5      2017 07:27  Phos  3.5     07-14  Mg     1.6     07-14    TPro  9.1<H>  /  Alb  3.5  /  TBili  0.2  /  DBili  x   /  AST  21  /  ALT  22  /  AlkPhos  62  07-12    PT/INR - ( 2017 22:49 )   PT: 11.4 sec;   INR: 1.04 ratio         PTT - ( 2017 22:49 )  PTT:30.8 sec  CAPILLARY BLOOD GLUCOSE      Urinalysis Basic - ( 2017 22:57 )    Color: Yellow / Appearance: Clear / S.010 / pH: x  Gluc: x / Ketone: Negative  / Bili: Negative / Urobili: Negative   Blood: x / Protein: 30 mg/dL / Nitrite: Negative   Leuk Esterase: Trace / RBC: >50 /HPF / WBC 3-5 /HPF   Sq Epi: x / Non Sq Epi: Few / Bacteria: Trace /HPF        MEDICATIONS  (STANDING):  sodium chloride 0.9%. 1000 milliLiter(s) (60 mL/Hr) IV Continuous <Continuous>    MEDICATIONS  (PRN):      REVIEW OF SYSTEMS:  CONSTITUTIONAL: No fever, weight loss, or fatigue  EYES: No eye pain, visual disturbances, or discharge  ENMT:  No difficulty hearing, tinnitus, vertigo; No sinus or throat pain  NECK: No pain or stiffness  RESPIRATORY: No cough, wheezing, chills or hemoptysis; No shortness of breath  CARDIOVASCULAR: No chest pain, palpitations, dizziness, or leg swelling  GASTROINTESTINAL: No abdominal or epigastric pain. No nausea, vomiting, or hematemesis; No diarrhea or constipation. No melena or hematochezia.  GENITOURINARY: No dysuria, frequency, hematuria, or incontinence  NEUROLOGICAL: No headaches, memory loss, loss of strength, numbness, or tremors  SKIN: No itching, burning, rashes, or lesions   LYMPH NODES: No enlarged glands  ENDOCRINE: No heat or cold intolerance; No hair loss  MUSCULOSKELETAL: No joint pain or swelling; No muscle, back, or extremity pain  PSYCHIATRIC: No depression, anxiety, mood swings, or difficulty sleeping  HEME/LYMPH: No easy bruising, or bleeding gums  ALLERGY AND IMMUNOLOGIC: No hives or eczema    RADIOLOGY & ADDITIONAL TESTS:  < from: CT Angio Abdomen and Pelvis w/ IV Cont (17 @ 01:31) >  There is a fusiform infrarenal abdominal aortic aneurysm measuring up to   7.1 cm maximally. No CT findings of impending rupture are seen.  The celiac axis and SMA are patent. ANGELA is patent. Bilateral renal   arteries are patent. The bilateral iliac arteries are normal caliber.   Bilateral commonfemoral artery measuring up to 1.1 cm with no   significant atherosclerotic disease.  Thorax:  Airways: Tracheobronchial tree is patent.  Lungs: No pneumonia, pulmonary edema, or dominant masses. Moderate   predominantly paraseptal emphysema.  Biliary: No dilatation. Cholelithiasis.  Kidneys: No hydronephrosis or solid mass. Mildly irregular enhancement of   the interpolar region the left kidney is likely secondary to contrast   bolus timing, although it would be difficult to entirely exclude the   possibility of a renal mass. Mild bilateral perinephric stranding is   nonspecific.    IMPRESSION:  Negative for aortic dissection.  Large infrarenal AAA measuring 7.1 cm. No CT findings of rupture.      < from: US Duplex Arterial Lower Ext Compl, Bilateral (17 @ 13:19) >  Interpretation:   Right leg: There is ectasia of the right popliteal artery to a maximum   measured diameter of 8.3 mm. Velocity is symmetrical with the left side.  Left leg: There is ectasia of the left popliteal artery to a maximum   measured diameter of 7.8 mm. Velocity is symmetrical with the right side.  IMPRESSION:  Prominent popliteal arteries which do not meet criteria for   aneurysmal dilatation.        Imaging Personally Reviewed:  [x] YES  [ ] NO    Consultant(s) Notes Reviewed:  [x] YES  [ ] NO    PHYSICAL EXAM:  GENERAL: NAD, well-groomed, well-developed male lying in bed  HEAD:  Atraumatic, Normocephalic  EYES: EOMI, PERRLA, conjunctiva and sclera clear  ENMT: No tonsillar erythema, exudates, or enlargement; Moist mucous membranes, Good dentition, No lesions  NECK: Supple, No JVD noted  NERVOUS SYSTEM:  Alert & Oriented X3, Good concentration; Motor Strength 5/5 B/L upper and lower extremities; DTRs 2+ intact and symmetric  CHEST/LUNG: Clear to percussion bilaterally; No rales, rhonchi, wheezing, or rubs  HEART: Regular rate and rhythm; No murmurs, rubs, or gallops  ABDOMEN: pulsatile aortic area noted. Soft, Nontender, Nondistended; Bowel sounds present  EXTREMITIES:  2+ Peripheral Pulses, No clubbing, cyanosis, or edema  LYMPH: No lymphadenopathy noted  SKIN: No rashes or lesions    Care Collaborated Discussed with Consultants/Other Providers [x] YES  [ ] NO

## 2017-07-14 NOTE — DISCHARGE NOTE ADULT - PATIENT PORTAL LINK FT
“You can access the [facility] Patient Portal, offered by [facility], by registering with the following website: [email or web address]”

## 2017-07-14 NOTE — DISCHARGE NOTE ADULT - HOSPITAL COURSE
72M from home PMH of Throat CA (resolved, in remission for last 10 years), HTN (off meds), HLD (off meds) and CKD presents to hospital after being sent by nephrologist for AAA. Seen by vascular, no need for urgent intervention. But found to be anemic as well. Admitted to medicine to management     AAA (abdominal aortic aneurysm) without rupture.    - Patient has 7.1 cm AAA  -seen by vascular surgeon Dr. Laura  -large but asymptomatic  -Plan for outpatient repair  -LE Arterial US negative for popliteal aneurysm     Anemia  - Patient found to be anemic  -patient has no prior labs for comparison  -Spoke with Dr. Awad, likely anemia of CKD, given 1 dose of 20,000 Procrit and monitored response.   -Will follow with him for aranesp as outpatien     CKD (chronic kidney disease).  -CKD stage 4  -seen by Dr. Awad in house  -PT got CT scan with IV contrast to evaluate AAA, got pre and post treatment, monitored Cr- remains stable    Throat cancer.    - In remission, has not recurred in 10 years  -no need for intervention.      HLD (hyperlipidemia).    As per patient, used to be on medication but no longer needed it  -lipid profile WNL    HTN (hypertension).  - Not on any medications at home and BP has been normal here in the hospital     Need for prophylactic measure.    - No indication for chem DVT PPX for IMPROVE score = 1  will give venodynes  No indication for GI PPX.

## 2017-07-14 NOTE — PROGRESS NOTE ADULT - PROBLEM SELECTOR PLAN 2
continue renal diet renal function stable  -hepatitis panel neg. other serologies pending will be reviewed as outpatient.  -Cleared by renal for DC   -continue renal diet

## 2017-07-14 NOTE — PROGRESS NOTE ADULT - ASSESSMENT
72 yr old male with CKD IV with HTN. Admitted for evaluation of aortic aneurysm
72M from home PMH of Throat CA (resolved, in remission for last 10 years), HTN (off meds), HLD (off meds) and CKD presents to hospital after being sent by nephrologist for AAA. Seen by vascular, no need for urgent intervention. But found to be anemic as well
Patient is a 72y old  Male who presents with a chief complaint of Sent by nephrologist for finding on US Bladder as outpatient (13 Jul 2017 12:05). PMH significant for HTN, HLD, CKD stage 4, and throat cancer 10yrs ago. Given epoetin and now stable for discharge. Pt to follow-up with vascular as outpatient since currently asymptomatic.

## 2017-07-14 NOTE — PROGRESS NOTE ADULT - PROBLEM SELECTOR PROBLEM 1
AAA (abdominal aortic aneurysm) without rupture
Stage 4 chronic kidney disease
AAA (abdominal aortic aneurysm) without rupture

## 2017-07-14 NOTE — DISCHARGE NOTE ADULT - CARE PLAN
Principal Discharge DX:	AAA (abdominal aortic aneurysm) without rupture  Goal:	Patient will remain asymptomatic  Instructions for follow-up, activity and diet:	Please follow-up with vascular as outpatient for AAA management  Renal diet  Activity as tolerated  Secondary Diagnosis:	Anemia  Secondary Diagnosis:	CKD (chronic kidney disease)  Secondary Diagnosis:	HLD (hyperlipidemia)  Secondary Diagnosis:	HTN (hypertension) Principal Discharge DX:	AAA (abdominal aortic aneurysm) without rupture  Goal:	Patient will remain asymptomatic  Instructions for follow-up, activity and diet:	Please follow-up with vascular ([place]) as outpatient for AAA management  Renal diet  Activity as tolerated  Secondary Diagnosis:	Anemia  Instructions for follow-up, activity and diet:	Follow - up with MD Awad as outpatient for aranesp  Secondary Diagnosis:	CKD (chronic kidney disease)  Instructions for follow-up, activity and diet:	Follow up with MD Awad for pending serology review, and further management  Secondary Diagnosis:	HLD (hyperlipidemia)  Instructions for follow-up, activity and diet:	follow-up with PCP  Secondary Diagnosis:	HTN (hypertension)  Instructions for follow-up, activity and diet:	follow-up with PCP

## 2017-07-15 LAB
ANA PAT FLD IF-IMP: ABNORMAL
ANA TITR SER: ABNORMAL
T3FREE SERPL-MCNC: 2.05 PG/ML — SIGNIFICANT CHANGE UP (ref 1.8–4.6)

## 2017-07-17 LAB
% ALBUMIN: 45.5 % — SIGNIFICANT CHANGE UP
% ALPHA 1: 5.2 % — SIGNIFICANT CHANGE UP
% ALPHA 2: 12.1 % — SIGNIFICANT CHANGE UP
% BETA: 11.6 % — SIGNIFICANT CHANGE UP
% GAMMA, URINE: 30.2 % — SIGNIFICANT CHANGE UP
% GAMMA: 25.6 % — SIGNIFICANT CHANGE UP
ALBUMIN 24H MFR UR ELPH: 46.6 % — SIGNIFICANT CHANGE UP
ALBUMIN SERPL ELPH-MCNC: 3.5 G/DL — LOW (ref 3.6–5.5)
ALBUMIN/GLOB SERPL ELPH: 0.8 RATIO — SIGNIFICANT CHANGE UP
ALPHA1 GLOB 24H MFR UR ELPH: 6.4 % — SIGNIFICANT CHANGE UP
ALPHA1 GLOB SERPL ELPH-MCNC: 0.4 G/DL — SIGNIFICANT CHANGE UP (ref 0.1–0.4)
ALPHA2 GLOB 24H MFR UR ELPH: 8.1 % — SIGNIFICANT CHANGE UP
ALPHA2 GLOB SERPL ELPH-MCNC: 0.9 G/DL — SIGNIFICANT CHANGE UP (ref 0.5–1)
B-GLOBULIN 24H MFR UR ELPH: 8.7 % — SIGNIFICANT CHANGE UP
B-GLOBULIN SERPL ELPH-MCNC: 0.9 G/DL — SIGNIFICANT CHANGE UP (ref 0.5–1)
GAMMA GLOBULIN: 2 G/DL — HIGH (ref 0.6–1.6)
INTERPRETATION 24H UR IFE-IMP: SIGNIFICANT CHANGE UP
INTERPRETATION SERPL IFE-IMP: SIGNIFICANT CHANGE UP
M PROTEIN 24H UR ELPH-MRATE: SIGNIFICANT CHANGE UP
PROT ?TM UR-MCNC: 43 MG/DL — HIGH (ref 0–12)
PROT PATTERN 24H UR ELPH-IMP: SIGNIFICANT CHANGE UP
PROT PATTERN SERPL ELPH-IMP: SIGNIFICANT CHANGE UP
TOTAL VOLUME - 24 HOUR: SIGNIFICANT CHANGE UP ML
URINE CREATININE CALCULATION: SIGNIFICANT CHANGE UP G/24 H (ref 1–2)

## 2017-07-20 DIAGNOSIS — I71.4 ABDOMINAL AORTIC ANEURYSM, WITHOUT RUPTURE: ICD-10-CM

## 2017-07-20 DIAGNOSIS — R31.29 OTHER MICROSCOPIC HEMATURIA: ICD-10-CM

## 2017-07-20 DIAGNOSIS — Z85.89 PERSONAL HISTORY OF MALIGNANT NEOPLASM OF OTHER ORGANS AND SYSTEMS: ICD-10-CM

## 2017-07-20 DIAGNOSIS — N18.4 CHRONIC KIDNEY DISEASE, STAGE 4 (SEVERE): ICD-10-CM

## 2017-07-20 DIAGNOSIS — D63.1 ANEMIA IN CHRONIC KIDNEY DISEASE: ICD-10-CM

## 2017-07-20 DIAGNOSIS — E78.5 HYPERLIPIDEMIA, UNSPECIFIED: ICD-10-CM

## 2017-07-20 DIAGNOSIS — I12.9 HYPERTENSIVE CHRONIC KIDNEY DISEASE WITH STAGE 1 THROUGH STAGE 4 CHRONIC KIDNEY DISEASE, OR UNSPECIFIED CHRONIC KIDNEY DISEASE: ICD-10-CM

## 2017-07-20 PROBLEM — Z00.00 ENCOUNTER FOR PREVENTIVE HEALTH EXAMINATION: Status: ACTIVE | Noted: 2017-07-20

## 2017-08-28 ENCOUNTER — APPOINTMENT (OUTPATIENT)
Dept: VASCULAR SURGERY | Facility: CLINIC | Age: 72
End: 2017-08-28
Payer: MEDICARE

## 2017-08-28 VITALS
HEIGHT: 64 IN | HEART RATE: 65 BPM | DIASTOLIC BLOOD PRESSURE: 72 MMHG | SYSTOLIC BLOOD PRESSURE: 146 MMHG | BODY MASS INDEX: 24.59 KG/M2 | OXYGEN SATURATION: 100 % | TEMPERATURE: 98.5 F | WEIGHT: 144 LBS

## 2017-08-28 VITALS
WEIGHT: 144 LBS | HEART RATE: 65 BPM | DIASTOLIC BLOOD PRESSURE: 75 MMHG | HEIGHT: 64 IN | TEMPERATURE: 98.5 F | OXYGEN SATURATION: 100 % | SYSTOLIC BLOOD PRESSURE: 130 MMHG | BODY MASS INDEX: 24.59 KG/M2

## 2017-08-28 DIAGNOSIS — Z85.819 PERSONAL HISTORY OF MALIGNANT NEOPLASM OF UNSPECIFIED SITE OF LIP, ORAL CAVITY, AND PHARYNX: ICD-10-CM

## 2017-08-28 DIAGNOSIS — D64.9 ANEMIA, UNSPECIFIED: ICD-10-CM

## 2017-08-28 DIAGNOSIS — N28.9 DISORDER OF KIDNEY AND URETER, UNSPECIFIED: ICD-10-CM

## 2017-08-28 PROCEDURE — 93880 EXTRACRANIAL BILAT STUDY: CPT

## 2017-08-28 PROCEDURE — XXXXX: CPT

## 2017-08-28 PROCEDURE — 99213 OFFICE O/P EST LOW 20 MIN: CPT | Mod: 25

## 2017-09-03 PROBLEM — N28.9 KIDNEY PROBLEM: Status: ACTIVE | Noted: 2017-08-28

## 2017-09-03 PROBLEM — D64.9 ANEMIA: Status: ACTIVE | Noted: 2017-08-28

## 2017-09-03 PROBLEM — Z85.819 HISTORY OF THROAT CANCER: Status: RESOLVED | Noted: 2017-08-28 | Resolved: 2017-09-03

## 2020-12-31 NOTE — PATIENT PROFILE ADULT. - MEDICATION HERBAL REMEDIES, PROFILE
Patient returning missed call.   From reviewing notes looks like patient missed call from MA yesterday who was calling to confirm appointments for today.   Patient currently in clinic seeing Dr. Darling.    no

## 2021-11-03 NOTE — PROGRESS NOTE ADULT - PROBLEM SELECTOR PLAN 4
History History - patient not on home medication  -normotensive now in hospital  -continue to monitor. Consider starting medication if becomes hypertensive forehead

## 2022-06-08 NOTE — DISCHARGE NOTE ADULT - SECONDARY DIAGNOSIS.
Anemia CKD (chronic kidney disease) HLD (hyperlipidemia) HTN (hypertension) Cephalexin Pregnancy And Lactation Text: This medication is Pregnancy Category B and considered safe during pregnancy.  It is also excreted in breast milk but can be used safely for shorter doses.

## 2023-08-08 NOTE — PROGRESS NOTE ADULT - PROBLEM SELECTOR PLAN 7
Nutrition Recommendations for Enteral Feeding at Home     Type of Feeding Tube: Tube not yet placed, anticipate post-pyloric   Tube Feeding Formula: Osmolite 1.5 Calorie / 1.5 Calorie Formula No Fiber (or equivalent)  Tube Feeding Instructions: 80 ml/hr x 12 hours overnight, off during the day   Method of Administration: Pump (IV Pole and Pump Bags)  Water Flushes: 30 ml room temp water at start and end of feeding, 30 ml every 4 hours while aware     Calculated Energy Needs: [id number]  kcal  Calories from tube feedin kcal   Tube feeding meets 72% estimated calorie needs  Oral diet: 2 gm sodium diet with protein shakes     Usual Weight: 81.8 kg (22)  Estimated body mass index is 23.53 kg/m² as calculated from the following:    Height as of this encounter: 6' (1.829 m).    Weight as of this encounter: 78.7 kg.    No indication for chem DVT PPX for IMPROVE score = 1  will give venodynes  No indication for GI PPX